# Patient Record
Sex: FEMALE | Race: WHITE | NOT HISPANIC OR LATINO | Employment: OTHER | URBAN - METROPOLITAN AREA
[De-identification: names, ages, dates, MRNs, and addresses within clinical notes are randomized per-mention and may not be internally consistent; named-entity substitution may affect disease eponyms.]

---

## 2017-02-27 ENCOUNTER — HOSPITAL ENCOUNTER (OUTPATIENT)
Dept: RADIOLOGY | Facility: HOSPITAL | Age: 77
Discharge: HOME | End: 2017-02-27
Attending: ORTHOPAEDIC SURGERY
Payer: COMMERCIAL

## 2017-02-27 DIAGNOSIS — T07.XXXA FRACTURES: ICD-10-CM

## 2017-02-27 DIAGNOSIS — M25.559 HIP PAIN: ICD-10-CM

## 2017-02-27 DIAGNOSIS — M54.9 BACK PAIN: ICD-10-CM

## 2017-02-27 PROCEDURE — 77080 DXA BONE DENSITY AXIAL: CPT | Mod: TC

## 2017-02-27 PROCEDURE — 77080 DXA BONE DENSITY AXIAL: CPT | Mod: 26,,, | Performed by: RADIOLOGY

## 2018-05-15 ENCOUNTER — HOSPITAL ENCOUNTER (EMERGENCY)
Facility: HOSPITAL | Age: 78
Discharge: HOME | End: 2018-05-15
Attending: EMERGENCY MEDICINE
Payer: MEDICARE

## 2018-05-15 VITALS
DIASTOLIC BLOOD PRESSURE: 70 MMHG | HEIGHT: 65 IN | WEIGHT: 200 LBS | TEMPERATURE: 99 F | SYSTOLIC BLOOD PRESSURE: 152 MMHG | HEART RATE: 68 BPM | BODY MASS INDEX: 33.32 KG/M2 | OXYGEN SATURATION: 97 % | RESPIRATION RATE: 16 BRPM

## 2018-05-15 DIAGNOSIS — S09.90XA CLOSED HEAD INJURY, INITIAL ENCOUNTER: Primary | ICD-10-CM

## 2018-05-15 DIAGNOSIS — S01.81XA FACIAL LACERATION, INITIAL ENCOUNTER: ICD-10-CM

## 2018-05-15 PROCEDURE — 12011 RPR F/E/E/N/L/M 2.5 CM/<: CPT

## 2018-05-15 PROCEDURE — 99284 EMERGENCY DEPT VISIT MOD MDM: CPT | Mod: 25

## 2018-05-15 PROCEDURE — 25000003 PHARM REV CODE 250: Performed by: EMERGENCY MEDICINE

## 2018-05-15 RX ORDER — ACETAMINOPHEN 325 MG/1
650 TABLET ORAL
Status: COMPLETED | OUTPATIENT
Start: 2018-05-15 | End: 2018-05-15

## 2018-05-15 RX ORDER — LIDOCAINE HYDROCHLORIDE 20 MG/ML
5 INJECTION, SOLUTION EPIDURAL; INFILTRATION; INTRACAUDAL; PERINEURAL
Status: COMPLETED | OUTPATIENT
Start: 2018-05-15 | End: 2018-05-15

## 2018-05-15 RX ADMIN — LIDOCAINE HYDROCHLORIDE 100 MG: 20 INJECTION, SOLUTION EPIDURAL; INFILTRATION; INTRACAUDAL; PERINEURAL at 04:05

## 2018-05-15 RX ADMIN — ACETAMINOPHEN 650 MG: 325 TABLET, FILM COATED ORAL at 04:05

## 2018-05-15 NOTE — ED PROVIDER NOTES
Encounter Date: 5/15/2018    SCRIBE #1 NOTE: I, Thao Hilton, am scribing for, and in the presence of, Dr. Balderas.       History     Chief Complaint   Patient presents with    Head Injury     1.5 hour prior to arrival    Fall       05/15/2018 4:05 PM     Chief complaint: Head injury      Nam Lal is a 77 y.o. female with DM and HTN who presents to the ED with an onset of a frontal head injury with associated right eyebrow abrasion. She reports bending over and was about to fall forward but tried to catch herself but ran into her brick house ~1.5 hours PTA. The patient is on a daily baby Aspirin regimen. She remembers everything before, during, and after the injury. Her tetanus is UTD. The patient denies neck pain, syncope, or any other symptoms at this time. No pertinent SHx noted.       The history is provided by the patient.     Review of patient's allergies indicates:   Allergen Reactions    Cidex [glutaraldehyde]     Morphine     Mysoline [primidone]     Procardia [nifedipine]     Zocor [simvastatin]      Past Medical History:   Diagnosis Date    Arthritis     Asthma     Diabetes mellitus     Encounter for blood transfusion     Hypertension     Thyroid disease      Past Surgical History:   Procedure Laterality Date    COLON SURGERY      HYSTERECTOMY      JOINT REPLACEMENT      TONSILLECTOMY       Family History   Problem Relation Age of Onset    Cancer Mother      Social History   Substance Use Topics    Smoking status: Never Smoker    Smokeless tobacco: Not on file    Alcohol use 0.0 oz/week      Comment: occasional     Review of Systems   HENT:        Positive for head injury (frontal).    Musculoskeletal: Negative for neck pain.   Skin: Positive for wound (abrasion, right eyebrow).   Neurological: Negative for syncope.   All other systems reviewed and are negative.    Physical Exam     Initial Vitals [05/15/18 1523]   BP Pulse Resp Temp SpO2   (!) 152/70 68 16 98.5 °F (36.9 °C)  97 %      MAP       97.33         Physical Exam    Nursing note and vitals reviewed.  Constitutional: She appears well-developed and well-nourished. She is not diaphoretic. No distress.   HENT:   Head: Normocephalic. Head is with abrasion.   Mouth/Throat: Oropharynx is clear and moist.   Superficial 1 cm skin flap in the right orbital region, inferior to the eyebrow but superior to the upper lid. No penetration of the deeper fascia, foreign body, tissue avulsion, or involvement in the lid itself. Able to open and close right eye without difficulty. No significant bony tenderness to palpation. Abrasions to the right fontal area.    Eyes: Conjunctivae are normal.   Neck: Neck supple. No spinous process tenderness present.   No C-spine tenderness.    Cardiovascular: Normal rate, regular rhythm and intact distal pulses. Exam reveals no gallop and no friction rub.    Murmur heard.   Systolic murmur is present with a grade of 3/6   Pulmonary/Chest: Breath sounds normal. She has no wheezes. She has no rhonchi. She has no rales.   Musculoskeletal: Normal range of motion.   Neurological: She is alert and oriented to person, place, and time. She has normal strength. No cranial nerve deficit or sensory deficit.   CN's, strength, and sensation intact.    Skin: Abrasion noted. No rash noted. No erythema.   Psychiatric: Her speech is normal.       ED Course   Lac Repair  Date/Time: 5/15/2018 5:22 PM  Performed by: COLEMAN ESTRADA.  Authorized by: COLEMAN ESTRADA   Location: right orbital region.  Laceration length: 1 cm  Foreign bodies: no foreign bodies  Tendon involvement: none  Nerve involvement: none  Anesthesia: local infiltration  Patient sedated: no  Preparation: Patient was prepped and draped in the usual sterile fashion.  Irrigation solution: saline  Irrigation method: syringe  Amount of cleaning: standard  Patient tolerance: Patient tolerated the procedure well with no immediate complications        Labs  Reviewed - No data to display     Imaging Results          CT Head Without Contrast (Final result)  Result time 05/15/18 16:33:34    Final result by Herbie Reyna MD (05/15/18 16:33:34)                 Impression:      1. There is no acute abnormality.  There is volume loss and nonspecific white matter change.  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  There is no intracranial hemorrhage, mass effect, acute edema or ischemia.  2. There is no acute skull fracture.  3. Atherosclerosis.      Electronically signed by: Herbie Reyna MD  Date:    05/15/2018  Time:    16:33             Narrative:    EXAMINATION:  CT HEAD WITHOUT CONTRAST    CLINICAL HISTORY:  Closed head injury, on aspirin, r/o ICH;    TECHNIQUE:  Routine unenhanced axial images were obtained through the head.  Sagittal and coronal reformatted images were created.  The study is reviewed in bone and soft tissue windows.    COMPARISON:  None    FINDINGS:  Intracranial contents: There is no acute intracranial abnormality.  There is mild volume loss with a moderate to marked burden of nonspecific and age indeterminate periventricular and subcortical white matter hypoattenuation.  These findings likely reflect sequela of chronic microvascular ischemic disease.  There is a chronic infarction in the anterior left basal ganglia adjacent to the left frontal horn.  There is no hydrocephalus or midline shift.  There is no intracranial hemorrhage or mass effect.  The gray-white interface is otherwise preserved without obvious acute infarction.  There is no dense vessel.  There is no abnormal extra-axial fluid collection.  The basilar cisterns are open.  There is moderate atherosclerosis.  The cerebellar tonsils are in normal position.  The sellar structures are normal.    Extracranial contents, calvarium, soft tissues: There is no acute skull fracture.  The included paranasal  sinuses and mastoid air cells are clear.                            (rad read)    LACERATION REPAIR:  Analgesia prior to repair using infiltration 2% lidocaine without epinephrine.  The wound was copiously irrigated using normal saline and explored without sign of foreign body.  A 1 cm laceration on the right supraorbital region not involving the upper eyelid was repaired using 5-0 vicryl rapide via simple interrupted superficla technique.  4 sutures total.  There was good approximation of the wound margins.  The patient tolerated the procedure well and there were no recognized complications.            Medical Decision Making:   History:   Old Medical Records: I decided to obtain old medical records.  Clinical Tests:   Radiological Study: Ordered and Reviewed            Scribe Attestation:   Scribe #1: I performed the above scribed service and the documentation accurately describes the services I performed. I attest to the accuracy of the note.    I, Dr. Tyler Balderas, personally performed the services described in this documentation. All medical record entries made by the scribe were at my direction and in my presence.  I have reviewed the chart and agree that the record reflects my personal performance and is accurate and complete. Tyler Balderas MD.  6:13 PM 05/15/2018    Nam Lal is a 77 y.o. 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  The patient is on aspirin.  Head CT performed with no sign of intracranial hemorrhage.  She has normal mental status with a nonfocal neurological examination. She does have facial abrasion with laceration in the right supraorbital region not involving the eyelid or the tarsal plate.  It is very superficial.  Possibility of scarring discussed with patient in detail prior to simple interrupted suture closure here.  This was performed with  recommended outpatient PCP follow-up.  Very low suspicion for foreign body.  I do not think prophylactic antibiotics are indicated.  Follow up with PCP.  Return precautions reviewed.             Clinical Impression:     1. Closed head injury, initial encounter    2. Facial laceration, initial encounter          Disposition:   Disposition: Discharged  Condition: Stable                        Tyler Balderas MD  05/15/18 7938

## 2018-05-15 NOTE — ED NOTES
Area around laceration cleaned gently pt given ice pack with instructions on home use. Given written and verbal DC instructions questions answered per MD aware to follow up with PCP encouraged to return if needed.

## 2018-05-15 NOTE — DISCHARGE INSTRUCTIONS
Dissolvable sutures were used to repair your laceration and may be removed or left to dissolve on their own.

## 2019-01-16 ENCOUNTER — HISTORICAL (OUTPATIENT)
Dept: ADMINISTRATIVE | Facility: HOSPITAL | Age: 79
End: 2019-01-16

## 2019-02-06 ENCOUNTER — TELEPHONE (OUTPATIENT)
Dept: ORTHOPEDICS | Facility: CLINIC | Age: 79
End: 2019-02-06

## 2019-02-06 NOTE — TELEPHONE ENCOUNTER
The nursing home was calling to schedule a follow up from surgery for right femur ORIF that was done by Dr Perez on 1/15/19 in [facility] ER. This was the patient's first follow up since surgery was done. We tried to get the patient in on 2/7/2019 since it had been so long since we have seen this patient since surgery. I was told that there would be no transportation and patient would have to wait until the week of February 11-15th. I spoke with Melia and Domi about this matter and we still urged the facility to please bring the patient in tomorrow on 2/7/19. They once again declined to bring patient and and to wait and made the patient an appt at 10:30 am on 2/12. The nursing home also stated they have a wound care nurse on staff there and they could take her gautam out if Melia could instruction them on wound care for the patient. Melia gave the instructions to the nursing home on how to take them out and what to do until her appt on 2/12.

## 2019-02-06 NOTE — TELEPHONE ENCOUNTER
Spoke with nursing home and stressed it was important for pt to come in tomorrow since it had been so long since surgery. I offered for them to bring her in anytime, she did not need an shelby't.  I was informed they had no transportation and it would have to wait until next week. An shelby't was made. Pt still has staples in that have not been removed. Gave instructions for the wound care nurse to remove staples.

## 2019-02-12 ENCOUNTER — OFFICE VISIT (OUTPATIENT)
Dept: ORTHOPEDICS | Facility: CLINIC | Age: 79
End: 2019-02-12
Payer: MEDICARE

## 2019-02-12 VITALS
DIASTOLIC BLOOD PRESSURE: 60 MMHG | WEIGHT: 212 LBS | BODY MASS INDEX: 35.32 KG/M2 | HEIGHT: 65 IN | HEART RATE: 72 BPM | SYSTOLIC BLOOD PRESSURE: 122 MMHG

## 2019-02-12 DIAGNOSIS — Z87.81 STATUS POST CLOSED FRACTURE OF RIGHT FEMUR: Primary | ICD-10-CM

## 2019-02-12 DIAGNOSIS — S72.92XD CLOSED FRACTURE OF LEFT FEMUR WITH ROUTINE HEALING, UNSPECIFIED FRACTURE MORPHOLOGY, UNSPECIFIED PORTION OF FEMUR, SUBSEQUENT ENCOUNTER: Primary | ICD-10-CM

## 2019-02-12 PROCEDURE — 73552 PR X-RAY EXAM OF FEMUR 2/> VIEWS: ICD-10-PCS | Mod: RT,,, | Performed by: ORTHOPAEDIC SURGERY

## 2019-02-12 PROCEDURE — 99024 POSTOP FOLLOW-UP VISIT: CPT | Mod: ,,, | Performed by: ORTHOPAEDIC SURGERY

## 2019-02-12 PROCEDURE — 99024 PR POST-OP FOLLOW-UP VISIT: ICD-10-PCS | Mod: ,,, | Performed by: ORTHOPAEDIC SURGERY

## 2019-02-12 PROCEDURE — 73552 X-RAY EXAM OF FEMUR 2/>: CPT | Mod: RT,,, | Performed by: ORTHOPAEDIC SURGERY

## 2019-02-12 RX ORDER — OXYCODONE AND ACETAMINOPHEN 5; 325 MG/1; MG/1
1 TABLET ORAL EVERY 6 HOURS PRN
Qty: 28 TABLET | Refills: 0 | Status: SHIPPED | OUTPATIENT
Start: 2019-02-12 | End: 2019-02-19

## 2019-02-12 NOTE — PROGRESS NOTES
[facility] POST-OP NOTE    Subjective:           Chief Complaint:   Chief Complaint   Patient presents with    Right Femur - Post-op Evaluation     Right femur ORIF 1.15.19. States that she is having some pain and states that it is not to bad.        Past Medical History:   Diagnosis Date    Arthritis     Asthma     Diabetes mellitus     Diabetes mellitus, type 2     Encounter for blood transfusion     Hypertension     Murmur     Thyroid disease        Past Surgical History:   Procedure Laterality Date    COLON SURGERY      ESOPHAGOGASTRODUODENOSCOPY (EGD) N/A 10/7/2015    Performed by [facility] at [facility]    ESOPHAGOGASTRODUODENOSCOPY (EGD) N/A 8/6/2015    Performed by [facility] at [facility]    HYSTERECTOMY      JOINT REPLACEMENT      TONSILLECTOMY         Current Outpatient Medications   Medication Sig    amlodipine (NORVASC) 10 MG tablet Take 5 mg by mouth 2 (two) times daily.    aspirin (ECOTRIN) 81 MG EC tablet Take 81 mg by mouth once daily.    buPROPion (WELLBUTRIN SR) 150 MG TBSR 12 hr tablet Take 150 mg by mouth once daily.    calcium-vitamin D (OSCAL) 250 (625)-125 mg-unit per tablet Take 1 tablet by mouth 2 (two) times daily.    chlorpheniramine (CHLOR-TRIMETON) 4 mg tablet Take 4 mg by mouth 3 (three) times daily as needed for Allergies.    clonazePAM (KLONOPIN) 1 MG tablet Take 1 mg by mouth 2 (two) times daily as needed for Anxiety.    fluoxetine (PROZAC) 20 MG capsule Take 20 mg by mouth once daily.    furosemide (LASIX) 40 MG tablet Take 20 mg by mouth once daily.    gabapentin (NEURONTIN) 300 MG capsule Take 300 mg by mouth 2 (two) times daily. 1 po qd and 3 po qhs    levothyroxine (SYNTHROID) 125 MCG tablet Take 125 mcg by mouth once daily.    losartan (COZAAR) 100 MG tablet Take 100 mg by mouth once daily.    magnesium oxide 420 mg Tab Take 1 tablet by mouth 2 (two) times daily.    metformin (GLUCOPHAGE) 500 MG tablet Take 500 mg by  mouth 2 (two) times daily with meals.    niacin (SLO-NIACIN) 500 mg tablet Take 500 mg by mouth 2 (two) times daily with meals.    oxycodone HCl/acetaminophen (PERCOCET ORAL) Take by mouth.    phenylephrine HCL 0.25% 0.25 % suppository Place 1 suppository rectally 3 (three) times daily.    potassium chloride (KLOR-CON) 10 MEQ TbSR Take 10 mEq by mouth once daily.    pravastatin (PRAVACHOL) 10 MG tablet Take 10 mg by mouth every evening.    propranolol (INDERAL) 40 MG tablet Take 40 mg by mouth 2 (two) times daily.    omeprazole (PRILOSEC) 20 MG capsule Take 2 capsules (40 mg total) by mouth once daily.     No current facility-administered medications for this visit.        Review of patient's allergies indicates:   Allergen Reactions    Cidex [glutaraldehyde]     Morphine     Mysoline [primidone]     Procardia [nifedipine]     Zocor [simvastatin]        Family History   Problem Relation Age of Onset    Cancer Mother        Social History     Socioeconomic History    Marital status:      Spouse name: Not on file    Number of children: Not on file    Years of education: Not on file    Highest education level: Not on file   Social Needs    Financial resource strain: Not on file    Food insecurity - worry: Not on file    Food insecurity - inability: Not on file    Transportation needs - medical: Not on file    Transportation needs - non-medical: Not on file   Occupational History    Not on file   Tobacco Use    Smoking status: Never Smoker   Substance and Sexual Activity    Alcohol use: Yes     Alcohol/week: 0.0 oz     Comment: occasional    Drug use: Not on file    Sexual activity: Not on file   Other Topics Concern    Not on file   Social History Narrative    Not on file       History of present illness: Patient returns for the right midshaft femur fracture.      Review of Systems:    Musculoskeletal:  See HPI      Objective:        Physical Examination:    Vital Signs:    Vitals:     02/12/19 1051   BP: 122/60   Pulse: 72       Body mass index is 35.28 kg/m².    This a well-developed, well nourished patient in no acute distress.  They are alert and oriented and cooperative to examination.        Wounds are clean dry and intact. Calf is soft.  Pertinent New Results:    XRAY Report / Interpretation:   AP and lateral of the right femur demonstrates her fracture periprosthetic to be in ideal position. The hardware is in ideal position. There is been no loss of reduction.    Assessment/Plan:        Stable following ORIF of the right periprosthetic femur fracture. Follow-up in 4 weeks for new films  This note was created using Dragon voice recognition software that occasionally misinterpreted phrases or words.

## 2019-02-13 ENCOUNTER — TELEPHONE (OUTPATIENT)
Dept: ORTHOPEDICS | Facility: CLINIC | Age: 79
End: 2019-02-13

## 2019-02-14 DIAGNOSIS — Z87.81 HISTORY OF FRACTURE OF HIP: Primary | ICD-10-CM

## 2019-02-18 ENCOUNTER — TELEPHONE (OUTPATIENT)
Dept: ORTHOPEDICS | Facility: CLINIC | Age: 79
End: 2019-02-18

## 2019-02-18 DIAGNOSIS — Z87.81 STATUS POST CLOSED FRACTURE OF RIGHT FEMUR: Primary | ICD-10-CM

## 2019-02-18 NOTE — TELEPHONE ENCOUNTER
Patient would like to  Rx for Oxycodone. Pt is moving to [place] and would like a Rx to take with her. Phone [phone] Cell [phone].

## 2019-02-18 NOTE — TELEPHONE ENCOUNTER
Spoke with pt, she is moving to [place] and will need a refill on pain meds. She already spoke with someone about a copy of her xrays and progress notes. I will speak with Dr. Perez and get back with her tomorrow

## 2019-02-19 RX ORDER — OXYCODONE AND ACETAMINOPHEN 7.5; 325 MG/1; MG/1
1 TABLET ORAL EVERY 6 HOURS PRN
Qty: 28 TABLET | Refills: 0 | Status: SHIPPED | OUTPATIENT
Start: 2019-02-19 | End: 2019-02-26

## 2019-02-19 RX ORDER — OXYCODONE AND ACETAMINOPHEN 5; 325 MG/1; MG/1
1 TABLET ORAL EVERY 6 HOURS PRN
Qty: 28 TABLET | Refills: 0 | Status: CANCELLED | OUTPATIENT
Start: 2019-02-19 | End: 2019-02-26

## 2019-07-11 NOTE — TELEPHONE ENCOUNTER
Monse with [facility] called and would like to see if she can get an order for a bedside commode for the patient.  Monse  [phone]   Fax is [phone]  
Order sent to vital  
daily starting 7/13/Shower only

## 2024-01-09 ENCOUNTER — HOSPITAL ENCOUNTER (EMERGENCY)
Facility: HOSPITAL | Age: 84
Discharge: HOME | End: 2024-01-09
Attending: EMERGENCY MEDICINE
Payer: OTHER GOVERNMENT

## 2024-01-09 VITALS
WEIGHT: 180 LBS | DIASTOLIC BLOOD PRESSURE: 74 MMHG | OXYGEN SATURATION: 100 % | RESPIRATION RATE: 17 BRPM | TEMPERATURE: 99 F | SYSTOLIC BLOOD PRESSURE: 167 MMHG | BODY MASS INDEX: 29.99 KG/M2 | HEIGHT: 65 IN | HEART RATE: 84 BPM

## 2024-01-09 DIAGNOSIS — R07.89 CHEST WALL PAIN: ICD-10-CM

## 2024-01-09 DIAGNOSIS — J18.9 ATYPICAL PNEUMONIA: Primary | ICD-10-CM

## 2024-01-09 DIAGNOSIS — J45.901 REACTIVE AIRWAY DISEASE WITH ACUTE EXACERBATION, UNSPECIFIED ASTHMA SEVERITY, UNSPECIFIED WHETHER PERSISTENT: ICD-10-CM

## 2024-01-09 DIAGNOSIS — J06.9 UPPER RESPIRATORY TRACT INFECTION, UNSPECIFIED TYPE: ICD-10-CM

## 2024-01-09 LAB
CREAT SERPL-MCNC: 1 MG/DL (ref 0.5–1.4)
INFLUENZA A, MOLECULAR: NEGATIVE
INFLUENZA B, MOLECULAR: NEGATIVE
SAMPLE: NORMAL
SARS-COV-2 RDRP RESP QL NAA+PROBE: NEGATIVE
SPECIMEN SOURCE: NORMAL

## 2024-01-09 PROCEDURE — 87502 INFLUENZA DNA AMP PROBE: CPT | Performed by: NURSE PRACTITIONER

## 2024-01-09 PROCEDURE — 93005 ELECTROCARDIOGRAM TRACING: CPT | Performed by: GENERAL PRACTICE

## 2024-01-09 PROCEDURE — 94761 N-INVAS EAR/PLS OXIMETRY MLT: CPT

## 2024-01-09 PROCEDURE — 25000242 PHARM REV CODE 250 ALT 637 W/ HCPCS: Performed by: EMERGENCY MEDICINE

## 2024-01-09 PROCEDURE — 63600175 PHARM REV CODE 636 W HCPCS: Performed by: EMERGENCY MEDICINE

## 2024-01-09 PROCEDURE — 94640 AIRWAY INHALATION TREATMENT: CPT

## 2024-01-09 PROCEDURE — 93010 ELECTROCARDIOGRAM REPORT: CPT | Mod: ,,, | Performed by: GENERAL PRACTICE

## 2024-01-09 PROCEDURE — 99284 EMERGENCY DEPT VISIT MOD MDM: CPT | Mod: 25

## 2024-01-09 PROCEDURE — 82565 ASSAY OF CREATININE: CPT

## 2024-01-09 PROCEDURE — U0002 COVID-19 LAB TEST NON-CDC: HCPCS | Performed by: NURSE PRACTITIONER

## 2024-01-09 PROCEDURE — 25000003 PHARM REV CODE 250: Performed by: EMERGENCY MEDICINE

## 2024-01-09 RX ORDER — PREDNISONE 20 MG/1
40 TABLET ORAL
Status: COMPLETED | OUTPATIENT
Start: 2024-01-09 | End: 2024-01-09

## 2024-01-09 RX ORDER — ALBUTEROL SULFATE 90 UG/1
1-2 AEROSOL, METERED RESPIRATORY (INHALATION) EVERY 6 HOURS PRN
Qty: 8 G | Refills: 0 | Status: SHIPPED | OUTPATIENT
Start: 2024-01-09

## 2024-01-09 RX ORDER — PREDNISONE 20 MG/1
40 TABLET ORAL DAILY
Qty: 10 TABLET | Refills: 0 | Status: SHIPPED | OUTPATIENT
Start: 2024-01-09 | End: 2024-01-14

## 2024-01-09 RX ORDER — DOXYCYCLINE 100 MG/1
100 CAPSULE ORAL EVERY 12 HOURS
Status: DISCONTINUED | OUTPATIENT
Start: 2024-01-09 | End: 2024-01-09 | Stop reason: HOSPADM

## 2024-01-09 RX ORDER — DOXYCYCLINE 100 MG/1
100 CAPSULE ORAL 2 TIMES DAILY
Qty: 20 CAPSULE | Refills: 0 | Status: SHIPPED | OUTPATIENT
Start: 2024-01-09 | End: 2024-01-19

## 2024-01-09 RX ORDER — BENZONATATE 100 MG/1
100 CAPSULE ORAL 3 TIMES DAILY PRN
Qty: 20 CAPSULE | Refills: 0 | Status: SHIPPED | OUTPATIENT
Start: 2024-01-09 | End: 2024-01-19

## 2024-01-09 RX ORDER — IPRATROPIUM BROMIDE AND ALBUTEROL SULFATE 2.5; .5 MG/3ML; MG/3ML
3 SOLUTION RESPIRATORY (INHALATION)
Status: COMPLETED | OUTPATIENT
Start: 2024-01-09 | End: 2024-01-09

## 2024-01-09 RX ORDER — DOXYCYCLINE 100 MG/1
100 CAPSULE ORAL EVERY 12 HOURS
Status: DISCONTINUED | OUTPATIENT
Start: 2024-01-09 | End: 2024-01-09

## 2024-01-09 RX ADMIN — PREDNISONE 40 MG: 20 TABLET ORAL at 07:01

## 2024-01-09 RX ADMIN — IPRATROPIUM BROMIDE AND ALBUTEROL SULFATE 3 ML: 2.5; .5 SOLUTION RESPIRATORY (INHALATION) at 07:01

## 2024-01-09 RX ADMIN — DOXYCYCLINE HYCLATE 100 MG: 100 CAPSULE ORAL at 07:01

## 2024-01-09 NOTE — FIRST PROVIDER EVALUATION
Emergency Department TeleTriage Encounter Note      CHIEF COMPLAINT    Chief Complaint   Patient presents with    Nasal Congestion     C/o cold symptoms x 1 week. Pt states not getting better.        VITAL SIGNS   Initial Vitals [01/09/24 1249]   BP Pulse Resp Temp SpO2   (!) 176/90 84 17 98.4 °F (36.9 °C) 97 %      MAP       --            ALLERGIES    Review of patient's allergies indicates:   Allergen Reactions    Cidex [glutaraldehyde]     Morphine     Mysoline [primidone]     Procardia [nifedipine]     Zocor [simvastatin]        PROVIDER TRIAGE NOTE  Patient presents with congestion and cough for 1 week. Reports chest pain and shortness of breath only with coughing. No fever.       ORDERS  Labs Reviewed - No data to display    ED Orders (720h ago, onward)      None              Virtual Visit Note: The provider triage portion of this emergency department evaluation and documentation was performed via [employer], a HIPAA-compliant telemedicine application, in concert with a tele-presenter in the room. A face to face patient evaluation with one of my colleagues will occur once the patient is placed in an emergency department room.      DISCLAIMER: This note was prepared with [employer] voice recognition transcription software. Garbled syntax, mangled pronouns, and other bizarre constructions may be attributed to that software system.

## 2024-01-10 NOTE — ED PROVIDER NOTES
Encounter Date: 1/9/2024       History     Chief Complaint   Patient presents with    Nasal Congestion     C/o cold symptoms x 1 week. Pt states not getting better.      This is an 83-year-old female presenting with cough, nasal congestion, shortness for breath, sore throat, wheeze.  These symptoms began approximately 10 days ago.  They have worsened.  She denies any known fever.  She does have wheezing on exam.  Patient says that she does use breathing treatments from time to time but that these are at her home in [place] and she was just here visiting.    The history is provided by the patient.     Review of patient's allergies indicates:   Allergen Reactions    Cidex [glutaraldehyde]     Morphine     Mysoline [primidone]     Procardia [nifedipine]     Zocor [simvastatin]      Past Medical History:   Diagnosis Date    Arthritis     Asthma     Diabetes mellitus     Diabetes mellitus, type 2     Encounter for blood transfusion     Hypertension     Murmur     Thyroid disease      Past Surgical History:   Procedure Laterality Date    COLON SURGERY      HYSTERECTOMY      JOINT REPLACEMENT      TONSILLECTOMY       Family History   Problem Relation Age of Onset    Cancer Mother      Social History     Tobacco Use    Smoking status: Never   Substance Use Topics    Alcohol use: Yes     Alcohol/week: 0.0 standard drinks of alcohol     Comment: occasional     Review of Systems   Constitutional:  Negative for fever.   HENT:  Positive for congestion and sore throat.    Respiratory:  Positive for cough and shortness of breath.    All other systems reviewed and are negative.      Physical Exam     Initial Vitals [01/09/24 1249]   BP Pulse Resp Temp SpO2   (!) 176/90 84 17 98.4 °F (36.9 °C) 97 %      MAP       --         Physical Exam    Nursing note and vitals reviewed.  Constitutional: She appears well-developed and well-nourished. She is not diaphoretic. No distress.   HENT:   Head: Normocephalic.   Eyes: Conjunctivae  are normal.   Neck: Neck supple.   Normal range of motion.  Cardiovascular:  Normal rate.           Pulmonary/Chest: No respiratory distress. She has wheezes.   Expiratory wheeze bilaterally   Abdominal: She exhibits no distension.   Musculoskeletal:         General: No edema.      Cervical back: Normal range of motion and neck supple.     Neurological: She is alert. She has normal strength.   Skin: Skin is warm and dry.   Psychiatric: She has a normal mood and affect.         ED Course   Procedures  Labs Reviewed   INFLUENZA A AND B ANTIGEN    Narrative:     Specimen Source->Nasopharyngeal Swab   SARS-COV-2 RNA AMPLIFICATION, QUAL   ISTAT CREATININE        ECG Results              EKG 12-lead (In process)  Result time 01/09/24 14:21:15      In process by Interface, Lab In [facility] (01/09/24 14:21:15)                   Narrative:    Test Reason : R07.89,    Vent. Rate : 073 BPM     Atrial Rate : 416 BPM     P-R Int : 000 ms          QRS Dur : 140 ms      QT Int : 432 ms       P-R-T Axes : 000 140 -18 degrees     QTc Int : 475 ms    Atrial fibrillation with frequent ventricular-paced complexes  Right bundle branch block  T wave abnormality, consider inferior ischemia  Abnormal ECG  No previous ECGs available    Referred By: [email or web address]   SELF           Confirmed By:                                   Imaging Results              X-Ray Chest PA And Lateral (Final result)  Result time 01/09/24 14:07:45      Final result by Mal Hannah MD (01/09/24 14:07:45)                   Narrative:    CLINICAL HISTORY:  83 years (1940) Female Cough, congestion, chest pain    TECHNIQUE:  Portable AP radiograph the chest. One view.    COMPARISON:  Radiograph from January 15, 2019.    FINDINGS:  There are faint linear opacities at the lung bases suggestive of atelectasis/scarring, aspiration/pneumonia or trace edema in the appropriate clinical setting. Costophrenic angles are seen without effusion. No pneumothorax is  identified. The heart is mildly enlarged, with an aortic valve endovascular stent graft The mediastinum is within normal limits. Osseous structures appear within normal limits. The visualized upper abdomen is unremarkable.    IMPRESSION:  Mild faint infrahilar and interstitial lung markings at the left greater than right lung base suggesting mild atypical infection/viral pneumonia or trace edema and/or atelectasis.                  .            Electronically signed by:  Mal Hannah MD  01/09/2024 02:07 PM [facility] Workstation: [id number]                                     Medications   predniSONE tablet 40 mg (has no administration in time range)   doxycycline capsule 100 mg (has no administration in time range)   albuterol-ipratropium 2.5 mg-0.5 mg/3 mL nebulizer solution 3 mL (3 mLs Nebulization Given 1/9/24 1940)     Medical Decision Making  83-year-old with URI symptoms, wheezing.  She is normal work of breathing, pulse ox 100% on room air.  COVID and flu swabs are negative.  CXR shows possible atypical versus viral pneumonia.  Patient was treated with steroid and nebulizer treatment in the ED. she will be discharged home with course of prednisone, doxycycline, albuterol inhaler.  I discussed return precautions with her.    Risk  Prescription drug management.                                      Clinical Impression:  Final diagnoses:  [R07.89] Chest wall pain  [J18.9] Atypical pneumonia (Primary)  [J06.9] Upper respiratory tract infection, unspecified type  [J45.901] Reactive airway disease with acute exacerbation, unspecified asthma severity, unspecified whether persistent                 Jem Barlow MD  01/09/24 1949

## 2024-01-10 NOTE — DISCHARGE INSTRUCTIONS
Return to the ER for worsening shortness a breath, fever, weakness, or for any other concerns.  Follow-up with your PCP for re-evaluation.